# Patient Record
Sex: FEMALE | Race: WHITE | NOT HISPANIC OR LATINO | URBAN - METROPOLITAN AREA
[De-identification: names, ages, dates, MRNs, and addresses within clinical notes are randomized per-mention and may not be internally consistent; named-entity substitution may affect disease eponyms.]

---

## 2018-09-04 ENCOUNTER — IMPORTED ENCOUNTER (OUTPATIENT)
Dept: URBAN - METROPOLITAN AREA CLINIC 50 | Facility: CLINIC | Age: 73
End: 2018-09-04

## 2018-09-25 ENCOUNTER — IMPORTED ENCOUNTER (OUTPATIENT)
Dept: URBAN - METROPOLITAN AREA CLINIC 50 | Facility: CLINIC | Age: 73
End: 2018-09-25

## 2018-09-25 NOTE — PATIENT DISCUSSION
"""Discussed. Will get OCT today. Recommend patient check amsler grid.  [employer] grid [employer]

## 2021-04-18 ASSESSMENT — VISUAL ACUITY
OD_CC: J1+@ 16 IN
OS_CC: 20/20
OD_CC: 20/20
OS_CC: J1+@ 16 IN

## 2021-04-18 ASSESSMENT — TONOMETRY
OS_IOP_MMHG: 16
OD_IOP_MMHG: 16

## 2021-06-14 ENCOUNTER — COMPREHENSIVE EXAM (OUTPATIENT)
Dept: URBAN - METROPOLITAN AREA CLINIC 49 | Facility: CLINIC | Age: 76
End: 2021-06-14

## 2021-06-14 DIAGNOSIS — E11.9: ICD-10-CM

## 2021-06-14 DIAGNOSIS — H52.4: ICD-10-CM

## 2021-06-14 DIAGNOSIS — H25.13: ICD-10-CM

## 2021-06-14 DIAGNOSIS — H35.373: ICD-10-CM

## 2021-06-14 PROCEDURE — 92134 CPTRZ OPH DX IMG PST SGM RTA: CPT

## 2021-06-14 PROCEDURE — 92014 COMPRE OPH EXAM EST PT 1/>: CPT

## 2021-06-14 PROCEDURE — 92015 DETERMINE REFRACTIVE STATE: CPT

## 2021-06-14 ASSESSMENT — VISUAL ACUITY
OD_GLARE: 20/25
OD_CC: 20/20
OS_CC: 20/25
OS_GLARE: 20/20
OU_CC: J1+

## 2021-06-14 ASSESSMENT — TONOMETRY
OS_IOP_MMHG: 18
OD_IOP_MMHG: 18

## 2021-07-28 NOTE — PATIENT DISCUSSION
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

## 2021-07-28 NOTE — PATIENT DISCUSSION
PT INDICATES OS WEAKER FOR YEARS - MAY BE 2ND TO PREVIOUS TREATMENT - I DO NOT THINK [place] LAMELLAR HOLE WILL LIKELY IMPROVE [place] - [id number]

## 2022-05-18 ENCOUNTER — COMPREHENSIVE EXAM (OUTPATIENT)
Dept: URBAN - METROPOLITAN AREA CLINIC 49 | Facility: CLINIC | Age: 77
End: 2022-05-18

## 2022-05-18 DIAGNOSIS — H35.373: ICD-10-CM

## 2022-05-18 DIAGNOSIS — E11.9: ICD-10-CM

## 2022-05-18 DIAGNOSIS — H53.2: ICD-10-CM

## 2022-05-18 DIAGNOSIS — H25.13: ICD-10-CM

## 2022-05-18 PROCEDURE — 92134 CPTRZ OPH DX IMG PST SGM RTA: CPT

## 2022-05-18 PROCEDURE — 92014 COMPRE OPH EXAM EST PT 1/>: CPT

## 2022-05-18 PROCEDURE — 92015 DETERMINE REFRACTIVE STATE: CPT

## 2022-05-18 ASSESSMENT — VISUAL ACUITY
OD_CC: 20/20-1
OU_CC: 20/20
OS_CC: 20/25
OD_GLARE: 20/20
OD_GLARE: 20/25
OU_CC: J1+ @ 14IN
OS_GLARE: 20/25
OS_GLARE: 20/30

## 2022-05-18 ASSESSMENT — TONOMETRY
OD_IOP_MMHG: 16
OS_IOP_MMHG: 16

## 2023-07-03 ENCOUNTER — COMPREHENSIVE EXAM (OUTPATIENT)
Dept: URBAN - METROPOLITAN AREA CLINIC 49 | Facility: CLINIC | Age: 78
End: 2023-07-03

## 2023-07-03 DIAGNOSIS — H35.373: ICD-10-CM

## 2023-07-03 DIAGNOSIS — H43.392: ICD-10-CM

## 2023-07-03 DIAGNOSIS — H04.123: ICD-10-CM

## 2023-07-03 DIAGNOSIS — H43.812: ICD-10-CM

## 2023-07-03 DIAGNOSIS — H25.13: ICD-10-CM

## 2023-07-03 DIAGNOSIS — E11.9: ICD-10-CM

## 2023-07-03 PROCEDURE — 92134 CPTRZ OPH DX IMG PST SGM RTA: CPT

## 2023-07-03 PROCEDURE — 92014 COMPRE OPH EXAM EST PT 1/>: CPT

## 2023-07-03 ASSESSMENT — VISUAL ACUITY
OS_GLARE: 20/40
OD_GLARE: 20/25
OS_GLARE: 20/30
OD_CC: 20/25-2
OS_CC: 20/25-2
OD_GLARE: 20/25
OU_CC: J1+@14"
OU_CC: 20/25-2

## 2023-07-03 ASSESSMENT — KERATOMETRY
OD_K2POWER_DIOPTERS: 45.00
OD_AXISANGLE_DEGREES: 101
OD_K1POWER_DIOPTERS: 43.75
OS_K1POWER_DIOPTERS: 43.50
OD_AXISANGLE2_DEGREES: 11

## 2023-07-03 ASSESSMENT — TONOMETRY
OS_IOP_MMHG: 16
OD_IOP_MMHG: 16

## 2024-06-06 ENCOUNTER — ESTABLISHED PATIENT (OUTPATIENT)
Dept: URBAN - METROPOLITAN AREA CLINIC 49 | Facility: LOCATION | Age: 79
End: 2024-06-06

## 2024-06-06 DIAGNOSIS — H25.13: ICD-10-CM

## 2024-06-06 DIAGNOSIS — H04.123: ICD-10-CM

## 2024-06-06 DIAGNOSIS — H43.812: ICD-10-CM

## 2024-06-06 DIAGNOSIS — H35.373: ICD-10-CM

## 2024-06-06 DIAGNOSIS — E11.9: ICD-10-CM

## 2024-06-06 DIAGNOSIS — H52.4: ICD-10-CM

## 2024-06-06 DIAGNOSIS — H43.392: ICD-10-CM

## 2024-06-06 PROCEDURE — 92015 DETERMINE REFRACTIVE STATE: CPT

## 2024-06-06 PROCEDURE — 99214 OFFICE O/P EST MOD 30 MIN: CPT

## 2024-06-06 PROCEDURE — 92134 CPTRZ OPH DX IMG PST SGM RTA: CPT

## 2024-06-06 ASSESSMENT — TONOMETRY
OD_IOP_MMHG: 16
OS_IOP_MMHG: 16

## 2024-06-06 ASSESSMENT — VISUAL ACUITY
OD_GLARE: 20/25
OD_PH: 20/30
OD_CC: J1+@14"
OU_CC: J1+@14"
OD_GLARE: 20/25
OS_PH: 20/25
OS_CC: J2@14"
OS_CC: 20/30-2
OU_CC: 20/25-2
OD_CC: 20/50-2

## 2024-06-06 ASSESSMENT — KERATOMETRY
OD_AXISANGLE2_DEGREES: 11
OD_K1POWER_DIOPTERS: 43.75
OD_AXISANGLE_DEGREES: 101
OS_K1POWER_DIOPTERS: 43.50
OD_K2POWER_DIOPTERS: 45.00

## 2025-06-17 ENCOUNTER — COMPREHENSIVE EXAM (OUTPATIENT)
Age: 80
End: 2025-06-17

## 2025-06-17 DIAGNOSIS — H43.812: ICD-10-CM

## 2025-06-17 DIAGNOSIS — H35.373: ICD-10-CM

## 2025-06-17 DIAGNOSIS — E11.9: ICD-10-CM

## 2025-06-17 DIAGNOSIS — H25.813: ICD-10-CM

## 2025-06-17 DIAGNOSIS — H04.123: ICD-10-CM

## 2025-06-17 PROCEDURE — 99214 OFFICE O/P EST MOD 30 MIN: CPT

## 2025-06-17 PROCEDURE — 92134 CPTRZ OPH DX IMG PST SGM RTA: CPT
